# Patient Record
Sex: MALE | Race: WHITE | NOT HISPANIC OR LATINO | URBAN - METROPOLITAN AREA
[De-identification: names, ages, dates, MRNs, and addresses within clinical notes are randomized per-mention and may not be internally consistent; named-entity substitution may affect disease eponyms.]

---
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
## 2021-05-16 VITALS — HEIGHT: 71 IN | WEIGHT: 250 LBS | BODY MASS INDEX: 35 KG/M2 | RESPIRATION RATE: 16 BRPM

## 2021-05-16 VITALS — RESPIRATION RATE: 16 BRPM | HEIGHT: 71 IN | WEIGHT: 250 LBS | BODY MASS INDEX: 35 KG/M2

## 2024-11-07 ENCOUNTER — HOSPITAL ENCOUNTER (EMERGENCY)
Facility: HOSPITAL | Age: 24
Discharge: HOME | End: 2024-11-07
Attending: EMERGENCY MEDICINE
Payer: OTHER MISCELLANEOUS

## 2024-11-07 VITALS
TEMPERATURE: 98.2 F | OXYGEN SATURATION: 99 % | BODY MASS INDEX: 32.47 KG/M2 | HEIGHT: 71 IN | DIASTOLIC BLOOD PRESSURE: 74 MMHG | HEART RATE: 61 BPM | RESPIRATION RATE: 14 BRPM | SYSTOLIC BLOOD PRESSURE: 135 MMHG | WEIGHT: 231.92 LBS

## 2024-11-07 DIAGNOSIS — S61.412A LACERATION OF LEFT HAND WITHOUT FOREIGN BODY, INITIAL ENCOUNTER: Primary | ICD-10-CM

## 2024-11-07 PROCEDURE — 99282 EMERGENCY DEPT VISIT SF MDM: CPT

## 2024-11-07 NOTE — ED TRIAGE NOTES
Pt to ED from work with reports of laceration to top of left hand after heavy object fell on it tonight.      Tetanus shot not UTD.

## 2024-11-07 NOTE — ED PROVIDER NOTES
"Time: 3:44 AM EST  Date of encounter:  11/7/2024  Independent Historian/Clinical History and Information was obtained by:   Patient    History is limited by: N/A    Chief Complaint: Hand laceration      History of Present Illness:  Patient is a 24 y.o. year old male who presents to the emergency department for evaluation of left hand laceration.  Patient states a piece of sharp edged metal came down and cut his left knuckle on his hand.  Patient denies any numbness tingling or weakness.  Patient denies significant pain and rates it just a burning sensation a 1 or a 2 out of 10.  Patient states tetanus shot is up-to-date within 5 years.  Patient is right-handed.      Patient Care Team  Primary Care Provider: Provider, No Known    Past Medical History:     No Known Allergies  History reviewed. No pertinent past medical history.  History reviewed. No pertinent surgical history.  History reviewed. No pertinent family history.    Home Medications:  Prior to Admission medications    Not on File        Social History:          Review of Systems:  Review of Systems   Musculoskeletal:  Negative for joint swelling.   Skin:  Positive for wound (Laceration).   Neurological:  Negative for weakness and numbness.   All other systems reviewed and are negative.       Physical Exam:  /74 (BP Location: Right arm, Patient Position: Sitting)   Pulse 61   Temp 98.2 °F (36.8 °C) (Oral)   Resp 14   Ht 180.3 cm (71\")   Wt 105 kg (231 lb 14.8 oz)   SpO2 99%   BMI 32.35 kg/m²     Physical Exam  Vitals and nursing note reviewed.   Constitutional:       Appearance: Normal appearance.   HENT:      Head: Atraumatic.      Mouth/Throat:      Mouth: Mucous membranes are moist.   Eyes:      Conjunctiva/sclera: Conjunctivae normal.   Cardiovascular:      Pulses: Normal pulses.   Pulmonary:      Effort: Pulmonary effort is normal.   Musculoskeletal:         General: Tenderness (Soft tissue tenderness at laceration site only) and signs of " injury (Laceration left hand) present. No swelling. Normal range of motion.      Cervical back: Normal range of motion.   Skin:     General: Skin is warm.      Capillary Refill: Capillary refill takes less than 2 seconds.      Comments: Patient has a 1.5 centimeter superficial laceration to the left second MCP head dorsal aspect.  No active bleeding   Neurological:      General: No focal deficit present.      Mental Status: He is alert.   Psychiatric:         Mood and Affect: Mood normal.         Behavior: Behavior normal.                  Procedures:  Laceration Repair    Date/Time: 11/7/2024 4:10 AM    Performed by: Pita Hernandez APRN  Authorized by: Lai Love MD    Consent:     Consent obtained:  Verbal    Consent given by:  Patient    Risks, benefits, and alternatives were discussed: yes      Risks discussed:  Infection, pain, poor cosmetic result, poor wound healing, retained foreign body, tendon damage, vascular damage, need for additional repair and nerve damage    Alternatives discussed:  No treatment  Universal protocol:     Procedure explained and questions answered to patient or proxy's satisfaction: yes      Imaging studies available: no      Patient identity confirmed:  Verbally with patient  Anesthesia:     Anesthesia method:  Local infiltration    Local anesthetic:  Lidocaine 1% WITH epi  Laceration details:     Location:  Hand    Hand location:  L hand, dorsum    Length (cm):  1.5    Depth (mm):  1  Pre-procedure details:     Preparation:  Patient was prepped and draped in usual sterile fashion  Exploration:     Hemostasis achieved with:  Direct pressure and epinephrine    Wound exploration: entire depth of wound visualized      Wound extent: no foreign bodies/material noted and no tendon damage noted      Contaminated: no    Treatment:     Area cleansed with:  Povidone-iodine    Amount of cleaning:  Standard    Irrigation solution:  Sterile saline    Irrigation volume:  100    Irrigation  method:  Syringe  Skin repair:     Repair method:  Sutures    Suture size:  4-0    Suture material:  Nylon    Suture technique:  Simple interrupted    Number of sutures:  3  Approximation:     Approximation:  Close  Repair type:     Repair type:  Simple  Post-procedure details:     Dressing:  Non-adherent dressing    Procedure completion:  Tolerated        Medical Decision Making:      Comorbidities that affect care:    None    External Notes reviewed:    Previous Clinic Note: Patient's last visit was to an urgent care back in 2021 for COVID-19      The following orders were placed and all results were independently analyzed by me:  Orders Placed This Encounter   Procedures    Laceration Repair    Wound Dressing       Medications Given in the Emergency Department:  Medications - No data to display     ED Course:         Labs:    Lab Results (last 24 hours)       ** No results found for the last 24 hours. **             Imaging:    No Radiology Exams Resulted Within Past 24 Hours      Differential Diagnosis and Discussion:    Laceration: Laceration was evaluated for arterial injury, ligamentous damage, and other neurovascular injury.        MDM  Number of Diagnoses or Management Options  Laceration of left hand without foreign body, initial encounter  Diagnosis management comments: The patient presented with a laceration in need of repair. See laceration repair note for details. The wound was irrigated with normal saline irrigation. 3 sutures were used to approximate the wound edges. Tetanus was not given.  Patient is up-to-date the patient tolerated the procedure well. Acute bleeding has ceased and the wound was approximated in the emergency department. Patient was counseled to keep the wound clean, dry, and out of the sun. Patient was counseled to change dressings daily. Patient was advised to return to the ED for worsening erythema, pain, swelling, fever, excessive drainage or signs of infection. They were  counseled to follow up for suture removal as described in the discharge instructions. Patient verbalizes understanding and agrees to follow up as instructed.    Risk of Complications, Morbidity, and/or Mortality  Presenting problems: low  Management options: low    Patient Progress  Patient progress: stable           Patient Care Considerations:    I considered x-ray but wound is very superficial patient has full range of motion and no bony tenderness      Consultants/Shared Management Plan:    None    Social Determinants of Health:    Patient is independent, reliable, and has access to care.       Disposition and Care Coordination:    Discharged: The patient is suitable and stable for discharge with no need for consideration of admission.    I have explained the patient´s condition, diagnoses and treatment plan based on the information available to me at this time. I have answered questions and addressed any concerns. The patient has a good  understanding of the patient´s diagnosis, condition, and treatment plan as can be expected at this point. The vital signs have been stable. The patient´s condition is stable and appropriate for discharge from the emergency department.      The patient will pursue further outpatient evaluation with the primary care physician or other designated or consulting physician as outlined in the discharge instructions. They are agreeable to this plan of care and follow-up instructions have been explained in detail. The patient has received these instructions in written format and has expressed an understanding of the discharge instructions. The patient is aware that any significant change in condition or worsening of symptoms should prompt an immediate return to this or the closest emergency department or call to 911.    Final diagnoses:   Laceration of left hand without foreign body, initial encounter        ED Disposition       ED Disposition   Discharge    Condition   Stable     Comment   --               This medical record created using voice recognition software.             Pita Hernandez, APRN  11/07/24 0642

## 2024-11-07 NOTE — Clinical Note
[facility] EMERGENCY ROOM  [address] [place] [address]  Phone: [phone]  Fax: [phone]    Yovanny Caldwell was seen and treated in our emergency department on 11/7/2024.  He may return to work on 11/08/2024.         Thank you for choosing [facility].    Lai Love MD

## 2025-05-27 ENCOUNTER — HOSPITAL ENCOUNTER (EMERGENCY)
Facility: HOSPITAL | Age: 25
Discharge: HOME | End: 2025-05-27
Attending: EMERGENCY MEDICINE | Admitting: EMERGENCY MEDICINE
Payer: COMMERCIAL

## 2025-05-27 VITALS
RESPIRATION RATE: 16 BRPM | WEIGHT: 210 LBS | BODY MASS INDEX: 29.4 KG/M2 | HEIGHT: 71 IN | HEART RATE: 58 BPM | DIASTOLIC BLOOD PRESSURE: 64 MMHG | SYSTOLIC BLOOD PRESSURE: 105 MMHG | TEMPERATURE: 98.6 F | OXYGEN SATURATION: 99 %

## 2025-05-27 DIAGNOSIS — R04.0 EPISTAXIS: Primary | ICD-10-CM

## 2025-05-27 LAB
ALBUMIN SERPL-MCNC: 4.3 G/DL (ref 3.5–5.2)
ALBUMIN/GLOB SERPL: 1.9 G/DL
ALP SERPL-CCNC: 68 U/L (ref 39–117)
ALT SERPL W P-5'-P-CCNC: 47 U/L (ref 1–41)
ANION GAP SERPL CALCULATED.3IONS-SCNC: 11.8 MMOL/L (ref 5–15)
AST SERPL-CCNC: 26 U/L (ref 1–40)
BASOPHILS # BLD AUTO: 0.06 10*3/MM3 (ref 0–0.2)
BASOPHILS NFR BLD AUTO: 0.8 % (ref 0–1.5)
BILIRUB SERPL-MCNC: 0.3 MG/DL (ref 0–1.2)
BUN SERPL-MCNC: 23 MG/DL (ref 6–20)
BUN/CREAT SERPL: 19.3 (ref 7–25)
CALCIUM SPEC-SCNC: 9.2 MG/DL (ref 8.6–10.5)
CHLORIDE SERPL-SCNC: 105 MMOL/L (ref 98–107)
CO2 SERPL-SCNC: 24.2 MMOL/L (ref 22–29)
CREAT SERPL-MCNC: 1.19 MG/DL (ref 0.76–1.27)
DEPRECATED RDW RBC AUTO: 38.5 FL (ref 37–54)
EGFRCR SERPLBLD CKD-EPI 2021: 87.5 ML/MIN/1.73
EOSINOPHIL # BLD AUTO: 0.12 10*3/MM3 (ref 0–0.4)
EOSINOPHIL NFR BLD AUTO: 1.7 % (ref 0.3–6.2)
ERYTHROCYTE [DISTWIDTH] IN BLOOD BY AUTOMATED COUNT: 11.9 % (ref 12.3–15.4)
GLOBULIN UR ELPH-MCNC: 2.3 GM/DL
GLUCOSE SERPL-MCNC: 96 MG/DL (ref 65–99)
HCT VFR BLD AUTO: 41.2 % (ref 37.5–51)
HGB BLD-MCNC: 14.4 G/DL (ref 13–17.7)
HOLD SPECIMEN: NORMAL
HOLD SPECIMEN: NORMAL
IMM GRANULOCYTES # BLD AUTO: 0.02 10*3/MM3 (ref 0–0.05)
IMM GRANULOCYTES NFR BLD AUTO: 0.3 % (ref 0–0.5)
LYMPHOCYTES # BLD AUTO: 1.44 10*3/MM3 (ref 0.7–3.1)
LYMPHOCYTES NFR BLD AUTO: 20.3 % (ref 19.6–45.3)
MCH RBC QN AUTO: 30.8 PG (ref 26.6–33)
MCHC RBC AUTO-ENTMCNC: 35 G/DL (ref 31.5–35.7)
MCV RBC AUTO: 88.2 FL (ref 79–97)
MONOCYTES # BLD AUTO: 0.43 10*3/MM3 (ref 0.1–0.9)
MONOCYTES NFR BLD AUTO: 6.1 % (ref 5–12)
NEUTROPHILS NFR BLD AUTO: 5.03 10*3/MM3 (ref 1.7–7)
NEUTROPHILS NFR BLD AUTO: 70.8 % (ref 42.7–76)
NRBC BLD AUTO-RTO: 0 /100 WBC (ref 0–0.2)
PLATELET # BLD AUTO: 227 10*3/MM3 (ref 140–450)
PMV BLD AUTO: 11.5 FL (ref 6–12)
POTASSIUM SERPL-SCNC: 3.7 MMOL/L (ref 3.5–5.2)
PROT SERPL-MCNC: 6.6 G/DL (ref 6–8.5)
RBC # BLD AUTO: 4.67 10*6/MM3 (ref 4.14–5.8)
SODIUM SERPL-SCNC: 141 MMOL/L (ref 136–145)
WBC NRBC COR # BLD AUTO: 7.1 10*3/MM3 (ref 3.4–10.8)
WHOLE BLOOD HOLD COAG: NORMAL
WHOLE BLOOD HOLD SPECIMEN: NORMAL

## 2025-05-27 PROCEDURE — 80053 COMPREHEN METABOLIC PANEL: CPT | Performed by: EMERGENCY MEDICINE

## 2025-05-27 PROCEDURE — 36415 COLL VENOUS BLD VENIPUNCTURE: CPT

## 2025-05-27 PROCEDURE — 85025 COMPLETE CBC W/AUTO DIFF WBC: CPT | Performed by: EMERGENCY MEDICINE

## 2025-05-27 PROCEDURE — 99283 EMERGENCY DEPT VISIT LOW MDM: CPT

## 2025-05-27 RX ORDER — GOLDENSEAL 350 MG
250 CAPSULE ORAL ONCE
COMMUNITY

## 2025-05-27 RX ADMIN — PHENYLEPHRINE HYDROCHLORIDE 2 SPRAY: 0.5 SPRAY NASAL at 08:04

## 2025-05-27 NOTE — DISCHARGE INSTRUCTIONS
Leave the nasal packing until removed by medical provider.  This will likely need to be in for roughly 10 days.  Take the antibiotics as prescribed.  Avoid strenuous activities until packing has been removed.  I placed a referral for ENT follow-up.  You should receive a phone call later this week with a follow-up appointment.  Return to the ER for any new symptoms or any other issues that may arise.

## 2025-05-27 NOTE — ED PROVIDER NOTES
Time: 9:19 AM EDT  Date of encounter:  5/27/2025  Independent Historian/Clinical History and Information was obtained by:   Patient  Chief Complaint: Recurrent nosebleed    History is limited by: N/A    History of Present Illness:  Patient is a 24 y.o. year old male who presents to the emergency department for evaluation of recurrent nosebleed.  Patient states that on Saturday he was playing volleyball.  Patient is that he was accidentally struck across the nose.  Patient reports at the time he had bleeding both sides of his nose was able to get this stopped.  It bled again on Sunday morning but stopped.  Patient reports that this morning and began bleeding again while at work.  Patient is that bled for about an hour.  He went to the nurses station at work.  He began to feel lightheaded so the nurse sent him to the ER for evaluation and treatment.    Patient Care Team  Primary Care Provider: ProviderVerenice Known    Past Medical History:     No Known Allergies  History reviewed. No pertinent past medical history.  History reviewed. No pertinent surgical history.  History reviewed. No pertinent family history.    Home Medications:  Prior to Admission medications    Medication Sig Start Date End Date Taking? Authorizing Provider   Apple Cider Vinegar 250 MG chewable tablet Chew 250 mg 1 time.    Mariela Espinoza MD   [id number] PO Take 5,000 mg by mouth 1 time.    Mariela Espinoza MD   Multiple Vitamins-Minerals (MENS MULTI HEALTH FORMULA PO) Take  by mouth.    Mariela Espinoza MD        Social History:   Social History     Tobacco Use    Smoking status: Never   Vaping Use    Vaping status: Never Used   Substance Use Topics    Alcohol use: Never    Drug use: Never         Review of Systems:  Review of Systems   Constitutional:  Negative for chills and fever.   HENT:  Positive for nosebleeds. Negative for congestion, ear pain and sore throat.    Eyes:  Negative for pain.   Respiratory:  Negative for  "cough, chest tightness and shortness of breath.    Cardiovascular:  Negative for chest pain.   Gastrointestinal:  Negative for abdominal pain, diarrhea, nausea and vomiting.   Genitourinary:  Negative for flank pain and hematuria.   Musculoskeletal:  Negative for joint swelling.   Skin:  Negative for pallor.   Neurological:  Positive for light-headedness. Negative for seizures and headaches.   All other systems reviewed and are negative.       Physical Exam:  /70 (BP Location: Left arm, Patient Position: Lying)   Pulse 70   Temp 98.6 °F (37 °C) (Oral)   Resp 20   Ht 180.3 cm (71\")   Wt 95.3 kg (210 lb)   SpO2 98%   BMI 29.29 kg/m²     Physical Exam    Vital signs were reviewed under triage note.  General appearance - Patient appears well-developed and well-nourished.  Patient is in no acute distress.  Head - Normocephalic, atraumatic.  Pupils - Equal, round, reactive to light.  Extraocular muscles are intact.  Conjunctiva is clear.  Nasal - Normal inspection.  There is stigmata of recent bleeding on the right nasal septum.  There is no active bleeding at this time.  There is no septal hematoma noted.    Tympanic membranes - Gray, intact without erythema or retractions.  Oral mucosa - Pink and moist without lesions or erythema.  Uvula is midline.  Chest wall - Atraumatic.  Chest wall is nontender.  There are no vesicular rashes noted.  Neck - Supple.  Trachea was midline.  There is no palpable lymphadenopathy or thyromegaly.  There are no meningeal signs  Lungs - Clear to auscultation and percussion bilaterally.  Heart - Regular rate and rhythm without any murmurs, clicks, or gallops.  Abdomen - Soft.  Bowel sounds are present.  There is no palpable tenderness.  There is no rebound, guarding, or rigidity.  There are no palpable masses.  There are no pulsatile masses.  Back - Spine is straight and midline.  There is no CVA tenderness.  Extremities - Intact x4 with full range of motion.  There is no " palpable edema.  Pulses are intact x4 and equal.  Neurologic - Patient is awake, alert, and oriented x3.  Cranial nerves II through XII are grossly intact.  Motor and sensory functions grossly intact.  Cerebellar function was normal.  Integument - There are no rashes.  There are no petechia or purpura lesions noted.  There are no vesicular lesions noted.             Procedures:  Procedures  Nasal packing - Verbal consent was obtained from the patient.  The right nare was prepped with 4 sprays of Rosamaria-Synephrine nasal spray.  The nasal cavity was evacuated of any clots.  I attempted to place a 10 cm pack however the patient would not sit still and tolerate insertion.  Thus, I had no other option then to place a 5 cm M[place]ell nasal pack in the nose on the right side.  Once it was fully inserted it was expanded with sterile saline.  Patient was monitored for 30 minutes with no signs of any active bleeding.    Medical Decision Making:      Comorbidities that affect care:    None    External Notes reviewed:    Previous Clinic Note: Office visit with HAYLIE Snell on 8/28/2021 was reviewed by me.      The following orders were placed and all results were independently analyzed by me:  Orders Placed This Encounter   Procedures    Comprehensive Metabolic Panel    [facility] Draw    CBC Auto Differential    Ambulatory Referral to ENT (Otolaryngology)    CBC & Differential    Green Top (Gel)    Lavender Top    Gold Top - SST    Light Blue Top       Medications Given in the Emergency Department:  Medications   phenylephrine (ROSAMARIA-SYNEPHRINE) 0.5 % nasal spray 2 spray (2 sprays Nasal Given 5/27/25 0804)        ED Course:     The patient was seen and evaluated in the ED by me.  The above history and physical examination was performed as documented.  Diagnostic data was obtained.  Results reviewed.  Findings were discussed with the patient.  Nasal packing was placed on the right side.  Patient is tolerated well.  Patient was  monitored for period time after packing was placed.  There is no evidence of active bleeding.  Patient for discharge home.  Patient be referred for outpatient ENT follow-up and treatment.    Labs:    Lab Results (last 24 hours)       Procedure Component Value Units Date/Time    CBC & Differential [[id number]]  (Abnormal) Collected: 05/27/25 0638    Specimen: Blood Updated: 05/27/25 0651    Narrative:      The following orders were created for panel order CBC & Differential.  Procedure                               Abnormality         Status                     ---------                               -----------         ------                     CBC Auto Differential[[id number]]        Abnormal            Final result                 Please view results for these tests on the individual orders.    Comprehensive Metabolic Panel [[id number]]  (Abnormal) Collected: 05/27/25 0638    Specimen: Blood Updated: 05/27/25 0705     Glucose 96 mg/dL      BUN 23 mg/dL      Creatinine 1.19 mg/dL      Sodium 141 mmol/L      Potassium 3.7 mmol/L      Chloride 105 mmol/L      CO2 24.2 mmol/L      Calcium 9.2 mg/dL      Total Protein 6.6 g/dL      Albumin 4.3 g/dL      ALT (SGPT) 47 U/L      AST (SGOT) 26 U/L      Alkaline Phosphatase 68 U/L      Total Bilirubin 0.3 mg/dL      Globulin 2.3 gm/dL      A/G Ratio 1.9 g/dL      BUN/Creatinine Ratio 19.3     Anion Gap 11.8 mmol/L      eGFR 87.5 mL/min/1.73     Narrative:      GFR Categories in Chronic Kidney Disease (CKD)              GFR Category          GFR (mL/min/1.73)    Interpretation  G1                    90 or greater        Normal or high (1)  G2                    60-89                Mild decrease (1)  G3a                   45-59                Mild to moderate decrease  G3b                   30-44                Moderate to severe decrease  G4                    15-29                Severe decrease  G5                    14 or less           Kidney failure    (1)In the absence  of evidence of kidney disease, neither GFR category G1 or G2 fulfill the criteria for CKD.    eGFR calculation 2021 CKD-EPI creatinine equation, which does not include race as a factor    CBC Auto Differential [[id number]]  (Abnormal) Collected: 05/27/25 0638    Specimen: Blood Updated: 05/27/25 0651     WBC 7.10 10*3/mm3      RBC 4.67 10*6/mm3      Hemoglobin 14.4 g/dL      Hematocrit 41.2 %      MCV 88.2 fL      MCH 30.8 pg      MCHC 35.0 g/dL      RDW 11.9 %      RDW-SD 38.5 fl      MPV 11.5 fL      Platelets 227 10*3/mm3      Neutrophil % 70.8 %      Lymphocyte % 20.3 %      Monocyte % 6.1 %      Eosinophil % 1.7 %      Basophil % 0.8 %      Immature Grans % 0.3 %      Neutrophils, Absolute 5.03 10*3/mm3      Lymphocytes, Absolute 1.44 10*3/mm3      Monocytes, Absolute 0.43 10*3/mm3      Eosinophils, Absolute 0.12 10*3/mm3      Basophils, Absolute 0.06 10*3/mm3      Immature Grans, Absolute 0.02 10*3/mm3      nRBC 0.0 /100 WBC              Imaging:    No Radiology Exams Resulted Within Past 24 Hours      Differential Diagnosis and Discussion:    Epistaxis: Differential diagnosis includes but is not limited to trauma, environmental exposure, coagulopathy, allergic, infectious, hypertension, foreign bodies, hormonal, and tumors.    Labs were collected in the emergency department and all labs were reviewed and interpreted by me.    MDM     Amount and/or Complexity of Data Reviewed  Clinical lab tests: reviewed             Patient Care Considerations:    CT scan of facial bones was considered.  There was no obvious deformity and this would not change the ED treatment.      Consultants/Shared Management Plan:    None    Social Determinants of Health:    Patient is independent, reliable, and has access to care.       Disposition and Care Coordination:    Discharged: The patient is suitable and stable for discharge with no need for consideration of admission.    I have explained the patient´s condition, diagnoses and  treatment plan based on the information available to me at this time. I have answered questions and addressed any concerns. The patient has a good  understanding of the patient´s diagnosis, condition, and treatment plan as can be expected at this point. The vital signs have been stable. The patient´s condition is stable and appropriate for discharge from the emergency department.      The patient will pursue further outpatient evaluation with the primary care physician or other designated or consulting physician as outlined in the discharge instructions. They are agreeable to this plan of care and follow-up instructions have been explained in detail. The patient has received these instructions in written format and has expressed an understanding of the discharge instructions. The patient is aware that any significant change in condition or worsening of symptoms should prompt an immediate return to this or the closest emergency department or call to 1.  I have explained discharge medications and the need for follow up with the patient/caretakers. This was also printed in the discharge instructions. Patient was discharged with the following medications and follow up:      Medication List        New Prescriptions      amoxicillin-clavulanate 875-125 MG per tablet  Commonly known as: AUGMENTIN  Take 1 tablet by mouth Every 12 (Twelve) Hours.               Where to Get Your Medications        These medications were sent to [id number] - [place], [place] - [address] [place] [address] AT [facility] [phone] [facility] [phone] [address] [address]      Phone: [phone]   amoxicillin-clavulanate 875-125 MG per tablet      [facility]  [address] [address] [phone]          Final diagnoses:   Epistaxis        ED Disposition       ED Disposition   Discharge    Condition   Stable     Comment   --               This medical record created using voice recognition software.             Oscar Randle, DO  05/28/25 1506       Oscar Randle, DO  05/28/25 1507

## 2025-05-27 NOTE — Clinical Note
[facility] EMERGENCY ROOM  [address] [place] [address]  Phone: [phone]  Fax: [phone]    Yovanny Caldwell was seen and treated in our emergency department on 5/27/2025.  He may return to work on 05/30/2025.         Thank you for choosing [facility].    Raghav Pavon RN
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
## 2025-05-28 ENCOUNTER — HOSPITAL ENCOUNTER (EMERGENCY)
Facility: HOSPITAL | Age: 25
Discharge: HOME | End: 2025-05-28
Attending: EMERGENCY MEDICINE | Admitting: EMERGENCY MEDICINE
Payer: COMMERCIAL

## 2025-05-28 VITALS
TEMPERATURE: 98 F | HEART RATE: 85 BPM | OXYGEN SATURATION: 99 % | HEIGHT: 71 IN | DIASTOLIC BLOOD PRESSURE: 80 MMHG | BODY MASS INDEX: 29.63 KG/M2 | SYSTOLIC BLOOD PRESSURE: 112 MMHG | WEIGHT: 211.64 LBS | RESPIRATION RATE: 16 BRPM

## 2025-05-28 DIAGNOSIS — R04.0 EPISTAXIS: Primary | ICD-10-CM

## 2025-05-28 LAB
BASOPHILS # BLD AUTO: 0.06 10*3/MM3 (ref 0–0.2)
BASOPHILS NFR BLD AUTO: 0.9 % (ref 0–1.5)
DEPRECATED RDW RBC AUTO: 39.1 FL (ref 37–54)
EOSINOPHIL # BLD AUTO: 0.05 10*3/MM3 (ref 0–0.4)
EOSINOPHIL NFR BLD AUTO: 0.7 % (ref 0.3–6.2)
ERYTHROCYTE [DISTWIDTH] IN BLOOD BY AUTOMATED COUNT: 12.2 % (ref 12.3–15.4)
HCT VFR BLD AUTO: 41 % (ref 37.5–51)
HGB BLD-MCNC: 14.2 G/DL (ref 13–17.7)
IMM GRANULOCYTES # BLD AUTO: 0.01 10*3/MM3 (ref 0–0.05)
IMM GRANULOCYTES NFR BLD AUTO: 0.1 % (ref 0–0.5)
LYMPHOCYTES # BLD AUTO: 1.03 10*3/MM3 (ref 0.7–3.1)
LYMPHOCYTES NFR BLD AUTO: 15.2 % (ref 19.6–45.3)
MCH RBC QN AUTO: 31 PG (ref 26.6–33)
MCHC RBC AUTO-ENTMCNC: 34.6 G/DL (ref 31.5–35.7)
MCV RBC AUTO: 89.5 FL (ref 79–97)
MONOCYTES # BLD AUTO: 0.29 10*3/MM3 (ref 0.1–0.9)
MONOCYTES NFR BLD AUTO: 4.3 % (ref 5–12)
NEUTROPHILS NFR BLD AUTO: 5.35 10*3/MM3 (ref 1.7–7)
NEUTROPHILS NFR BLD AUTO: 78.8 % (ref 42.7–76)
NRBC BLD AUTO-RTO: 0 /100 WBC (ref 0–0.2)
PLATELET # BLD AUTO: 224 10*3/MM3 (ref 140–450)
PMV BLD AUTO: 11.3 FL (ref 6–12)
RBC # BLD AUTO: 4.58 10*6/MM3 (ref 4.14–5.8)
WBC NRBC COR # BLD AUTO: 6.79 10*3/MM3 (ref 3.4–10.8)

## 2025-05-28 PROCEDURE — C9046 COCAINE HCL NASAL SOLUTION: HCPCS | Performed by: EMERGENCY MEDICINE

## 2025-05-28 PROCEDURE — 99283 EMERGENCY DEPT VISIT LOW MDM: CPT

## 2025-05-28 PROCEDURE — 36415 COLL VENOUS BLD VENIPUNCTURE: CPT

## 2025-05-28 PROCEDURE — 85025 COMPLETE CBC W/AUTO DIFF WBC: CPT | Performed by: NURSE PRACTITIONER

## 2025-05-28 PROCEDURE — 25010000002 COCAINE HCL 40 MG/ML SOLUTION: Performed by: EMERGENCY MEDICINE

## 2025-05-28 RX ORDER — COCAINE HYDROCHLORIDE 40 MG/ML
4 SOLUTION NASAL ONCE
Refills: 0 | Status: COMPLETED | OUTPATIENT
Start: 2025-05-28 | End: 2025-05-28

## 2025-05-28 RX ORDER — TRANEXAMIC ACID 100 MG/ML
500 INJECTION, SOLUTION INTRAVENOUS ONCE
Status: DISCONTINUED | OUTPATIENT
Start: 2025-05-28 | End: 2025-05-28 | Stop reason: HOSPADM

## 2025-05-28 RX ADMIN — PHENYLEPHRINE HYDROCHLORIDE 2 SPRAY: 0.5 SPRAY NASAL at 16:38

## 2025-05-28 RX ADMIN — COCAINE HYDROCHLORIDE 160 MG: 40 SOLUTION NASAL at 18:33

## 2025-05-28 NOTE — Clinical Note
[facility] EMERGENCY ROOM  [address] [place] [address]  Phone: [phone]  Fax: [phone]    Yovanny Caldwell was seen and treated in our emergency department on 5/28/2025.  He may return to work on 06/02/2025.         Thank you for choosing [facility].    Edmundo Richter, FRANCISCA

## 2025-05-28 NOTE — DISCHARGE INSTRUCTIONS
Thank you for allowing us to provide care for you today.  Your workup today revealed evidence of epistaxis with packing performed at bedside . Please follow up with Dr. Kirby's clinic for follow up to remove your rhino rocket. Please limit caffeine and stimulant use until follow up in clinic. Avoid contact sports. Do not tamper with the rocket. Return if the rocket falls out or your nose begins to bleed again. Thank you

## 2025-05-28 NOTE — ED PROVIDER NOTES
"SHARED VISIT ATTESTATION:    This visit was performed by myself and an APC.  I personally approved the management plan/medical decision making and take responsibility for the patient management.      SHARED VISIT NOTE:    Patient is 24 y.o. year old male that presents to the ED for evaluation of recurrent nosebleed.     Physical Exam    ED Course:    /80   Pulse 85   Temp 98 °F (36.7 °C)   Resp 16   Ht 180.3 cm (71\")   Wt 96 kg (211 lb 10.3 oz)   SpO2 99%   BMI 29.52 kg/m²       The following orders were placed and all results were independently analyzed by me:  Orders Placed This Encounter   Procedures    Epistaxis Management    Epistaxis Management    CBC Auto Differential    Ambulatory Referral to ENT (Otolaryngology)    CBC & Differential       Medications Given in the Emergency Department:  Medications   Cocaine HCl 40 MG/ML nasal solution 160 mg (160 mg Nasal Given 5/28/25 1833)        ED Course:    ED Course as of 05/29/25 1735   Wed May 28, 2025   1638 Epifanio-Synephrine, 2 sprays to each nostril administered [TB]   1730 Nose clamp applied after the Epifanio-Synephrine earlier, and reevaluation shows that bleeding has eased some. [TB]   1813 Patient has began bleeding heavily again from bilateral nares, but I believe is originating from the posterior right nare.  He is also beginning to vomit blood that he has likely swallowed from his nosebleed.  Corina LI spoke with Surya Jang, and we will be transferring patient over to main ED for monitoring. [TB]   1820 Report given to Edmundo in main ED [TB]      ED Course User Index  [TB] Chasidy Oden APRN       Labs:    Lab Results (last 24 hours)       Procedure Component Value Units Date/Time    Hemoglobin & Hematocrit, Blood [[id number]]  (Normal) Collected: 05/29/25 0741    Specimen: Blood from Arm, Right Updated: 05/29/25 0759     Hemoglobin 13.7 g/dL      Hematocrit 40.3 %              Imaging:    CT Sinus With Contrast  Result Date: 5/29/2025  CT SINUS W " CONTRAST Date of Exam: 5/29/2025 8:07 AM EDT Indication: eval for JNA, recurrent epistaxis. Comparison: None available. Technique: Axial CT images were obtained from the inferior aspect of the mandible through the frontal sinuses after the uneventful intravenous administration of iodinated contrast.  Reconstructed coronal and sagittal images were also obtained. Automated exposure control and iterative construction methods were used. Findings: There is no evidence of juvenile nasal angiofibroma. The sphenopalatine foramina appear normal bilaterally. There are air-fluid levels noted in the bilateral maxillary sinuses which demonstrate increased density. A small air-fluid level is noted in the right sphenoid sinus. There is opacification of the right ostiomeatal complex. There is opacification of several inferior right ethmoid sinuses. There is some opacity within the right nasal cavity. A cannula has been placed in the right nasal cavity. There are pooling secretions noted in the nasopharynx. There is a nondisplaced fracture of the left nasal bone. No other fractures identified. The visualized brain appears normal.     Impression: Tiny nondisplaced fracture of the left nasal bone. No evidence of nasal angiofibroma. Opacity in multiple sinuses and the right nasal cavity/nasopharynx suspected to represent hemorrhage. Electronically Signed: Frederick Blanco MD  5/29/2025 8:47 AM EDT  Workstation ID: [id number]      MDM:    Epistaxis Management    Date/Time: 5/29/2025 5:35 PM    Performed by: Lai Love MD  Authorized by: Lai Love MD    Consent:     Consent obtained:  Verbal    Consent given by:  Patient    Risks discussed:  Bleeding, pain, nasal injury and infection    Alternatives discussed:  No treatment and delayed treatment  Universal protocol:     Patient identity confirmed:  Verbally with patient  Anesthesia:     Anesthesia method:  Topical application    Topical anesthetic:  Cocaine  Procedure details:      Treatment site:  R posterior    Treatment method:  Nasal balloon    Treatment complexity:  Limited    Treatment episode: initial    Post-procedure details:     Assessment:  Bleeding stopped    Procedure completion:  Tolerated well, no immediate complications                           Lai Love MD  17:35 EDT  05/29/25         Lai Love MD  05/29/25 2639

## 2025-05-28 NOTE — ED PROVIDER NOTES
Time: 2:37 PM EDT  Date of encounter:  5/28/2025  Independent Historian/Clinical History and Information was obtained by:   Patient    History is limited by: N/A    Chief Complaint: Nosebleed, right nare      History of Present Illness:  Patient is a 24 y.o. year old male who presents to the emergency department for evaluation of nosebleed.  Patient was seen in the ED yesterday for the same thing and has a nasal tampon to the right nare.  Patient states he was just walking around his home today and it started bleeding again.  It is a posterior bleed.      Patient Care Team  Primary Care Provider: Provider, Verenice Known    Past Medical History:     No Known Allergies  History reviewed. No pertinent past medical history.  History reviewed. No pertinent surgical history.  History reviewed. No pertinent family history.    Home Medications:  Prior to Admission medications    Medication Sig Start Date End Date Taking? Authorizing Provider   amoxicillin-clavulanate (AUGMENTIN) 875-125 MG per tablet Take 1 tablet by mouth Every 12 (Twelve) Hours. 5/27/25   Oscar Randle DO   Apple Cider Vinegar 250 MG chewable tablet Chew 250 mg 1 time.    Mariela Espinoza MD   [id number] PO Take 5,000 mg by mouth 1 time.    Mariela Espinoza MD   Multiple Vitamins-Minerals (MENS MULTI HEALTH FORMULA PO) Take  by mouth.    Mariela Espinoza MD        Social History:   Social History     Tobacco Use    Smoking status: Never   Vaping Use    Vaping status: Never Used   Substance Use Topics    Alcohol use: Never    Drug use: Never         Review of Systems:  Review of Systems   Constitutional:  Negative for chills and fever.   HENT:  Positive for nosebleeds (Left nare leaking blood.  Right nare has nasal tampon). Negative for congestion, rhinorrhea and sore throat.    Eyes:  Negative for pain and visual disturbance.   Respiratory:  Negative for apnea, cough, chest tightness and shortness of breath.    Cardiovascular:  Negative for  "chest pain and palpitations.   Gastrointestinal:  Negative for abdominal pain, diarrhea, nausea and vomiting.   Genitourinary:  Negative for difficulty urinating and dysuria.   Musculoskeletal:  Negative for joint swelling and myalgias.   Skin:  Negative for color change.   Neurological:  Negative for seizures and headaches.   Psychiatric/Behavioral: Negative.     All other systems reviewed and are negative.       Physical Exam:  /80   Pulse 85   Temp 98 °F (36.7 °C)   Resp 16   Ht 180.3 cm (71\")   Wt 96 kg (211 lb 10.3 oz)   SpO2 99%   BMI 29.52 kg/m²     Physical Exam  Vitals and nursing note reviewed.   Constitutional:       General: He is not in acute distress.     Appearance: Normal appearance. He is not toxic-appearing.   HENT:      Head: Normocephalic and atraumatic.      Jaw: There is normal jaw occlusion.      Nose:      Comments: With heavy bleeding to the upper rear pharynx where it is coming from the right nare posteriorly.  Eyes:      General: Lids are normal.      Extraocular Movements: Extraocular movements intact.      Conjunctiva/sclera: Conjunctivae normal.      Pupils: Pupils are equal, round, and reactive to light.   Cardiovascular:      Rate and Rhythm: Normal rate and regular rhythm.      Pulses: Normal pulses.      Heart sounds: Normal heart sounds.   Pulmonary:      Effort: Pulmonary effort is normal. No respiratory distress.      Breath sounds: Normal breath sounds. No wheezing or rhonchi.   Abdominal:      General: Abdomen is flat.      Palpations: Abdomen is soft.      Tenderness: There is no abdominal tenderness. There is no guarding or rebound.   Musculoskeletal:         General: Normal range of motion.      Cervical back: Normal range of motion and neck supple.      Right lower leg: No edema.      Left lower leg: No edema.   Skin:     General: Skin is warm and dry.   Neurological:      Mental Status: He is alert and oriented to person, place, and time. Mental status is at " baseline.   Psychiatric:         Mood and Affect: Mood normal.                    Medical Decision Making:      Comorbidities that affect care:    None    External Notes reviewed:    Previous ED Note: ED note from 5/27/2025 for same complaint      The following orders were placed and all results were independently analyzed by me:  Orders Placed This Encounter   Procedures    Epistaxis Management    CBC Auto Differential    Ambulatory Referral to ENT (Otolaryngology)    CBC & Differential       Medications Given in the Emergency Department:  Medications   Cocaine HCl 40 MG/ML nasal solution 160 mg (160 mg Nasal Given 5/28/25 1833)        ED Course:    ED Course as of 05/29/25 0219   Wed May 28, 2025   1638 Epifanio-Synephrine, 2 sprays to each nostril administered [TB]   1730 Nose clamp applied after the Epifanio-Synephrine earlier, and reevaluation shows that bleeding has eased some. [TB]   1813 Patient has began bleeding heavily again from bilateral nares, but I believe is originating from the posterior right nare.  He is also beginning to vomit blood that he has likely swallowed from his nosebleed.  Corina LI spoke with Surya Jang, and we will be transferring patient over to main ED for monitoring. [TB]   1820 Report given to Edmundo in main ED [TB]      ED Course User Index  [TB] Chasidy Oden, HAYLIE       Labs:    Lab Results (last 24 hours)       Procedure Component Value Units Date/Time    CBC & Differential [[id number]]  (Abnormal) Collected: 05/28/25 1727    Specimen: Blood Updated: 05/28/25 1732    Narrative:      The following orders were created for panel order CBC & Differential.  Procedure                               Abnormality         Status                     ---------                               -----------         ------                     CBC Auto Differential[[id number]]        Abnormal            Final result                 Please view results for these tests on the individual orders.    CBC Auto  Differential [[id number]]  (Abnormal) Collected: 05/28/25 1727    Specimen: Blood Updated: 05/28/25 1732     WBC 6.79 10*3/mm3      RBC 4.58 10*6/mm3      Hemoglobin 14.2 g/dL      Hematocrit 41.0 %      MCV 89.5 fL      MCH 31.0 pg      MCHC 34.6 g/dL      RDW 12.2 %      RDW-SD 39.1 fl      MPV 11.3 fL      Platelets 224 10*3/mm3      Neutrophil % 78.8 %      Lymphocyte % 15.2 %      Monocyte % 4.3 %      Eosinophil % 0.7 %      Basophil % 0.9 %      Immature Grans % 0.1 %      Neutrophils, Absolute 5.35 10*3/mm3      Lymphocytes, Absolute 1.03 10*3/mm3      Monocytes, Absolute 0.29 10*3/mm3      Eosinophils, Absolute 0.05 10*3/mm3      Basophils, Absolute 0.06 10*3/mm3      Immature Grans, Absolute 0.01 10*3/mm3      nRBC 0.0 /100 WBC              Imaging:    No Radiology Exams Resulted Within Past 24 Hours      Differential Diagnosis and Discussion:    Epistaxis: Differential diagnosis includes but is not limited to trauma, environmental exposure, coagulopathy, allergic, infectious, hypertension, foreign bodies, hormonal, and tumors.    PROCEDURES:    Labs were collected in the emergency department and all labs were reviewed and interpreted by me.    No orders to display       Epistaxis Management    Date/Time: 5/29/2025 2:12 AM    Performed by: Edmundo Richter PA-C  Authorized by: Lai Love MD    Consent:     Consent obtained:  Verbal    Consent given by:  Patient    Risks, benefits, and alternatives were discussed: yes      Risks discussed:  Bleeding and infection    Alternatives discussed:  Delayed treatment, no treatment and alternative treatment  Universal protocol:     Patient identity confirmed:  Verbally with patient  Anesthesia:     Anesthesia method:  Topical application    Topical anesthetic:  Cocaine  Procedure details:     Treatment site:  R anterior    Repair method: Rhino Rocket.    Treatment complexity:  Limited    Treatment episode: recurring    Post-procedure details:     Assessment:   Bleeding stopped    Procedure completion:  Tolerated well, no immediate complications      MDM     Amount and/or Complexity of Data Reviewed  Clinical lab tests: reviewed    Summary, patient is a 24-year-old male presenting today secondary to acute epistaxis x 1 day.    Patient's vital signs remained stable throughout the entirety of the ED course.  Patient's H&H remained stable throughout the entirety of the ED course.  Patient's initial epistaxes treatment including phenylephrine spray, nasal compression, and anterior packing failed with patient's right nares epistaxis continuing.  Based on these findings, subsequent cocaine nasal solution was given for vasoconstriction.  This also failed to resolve epistaxis management alongside compression.  Due to failed initial therapies, a subsequent right naris Rhino Rocket was applied to the right nares and inflated.  Tolerated the procedure appropriately.  Patient's epistaxis subsequently stopped.  Patient had numerous repeat bedside examinations completed by myself which revealed no evidence of new onset epistaxis or oropharyngeal bleeding present.  I discussed outpatient Rhino Rocket management with the patient.  I discussed outpatient follow-up with ear nose throat in the next 48 to 72 hours related to this ED event.  I discussed return precautions.  Patient voiced understanding agreement at this time                  Patient Care Considerations:    SEPSIS was considered but is NOT present in the emergency department as SIRS criteria is not present. ANTIBIOTICS: I considered prescribing antibiotics as an outpatient however no bacterial focus of infection was found.      Consultants/Shared Management Plan:    SHARED VISIT: I have discussed the case with my supervising physician, Dr. Love who states Crees with treatment plan. The substantive portion of the medical decision was made by the attesting physician who made or approve the management plan and will take  responsibility for the patient.  Clinical findings were discussed and ultimate disposition was made in consult with supervising physician.    Social Determinants of Health:    Patient is independent, reliable, and has access to care.       Disposition and Care Coordination:    Discharged: I considered escalation of care by admitting this patient to the hospital, however patient is not meet sepsis or SIRS criteria    I have explained the patient´s condition, diagnoses and treatment plan based on the information available to me at this time. I have answered questions and addressed any concerns. The patient has a good  understanding of the patient´s diagnosis, condition, and treatment plan as can be expected at this point. The vital signs have been stable. The patient´s condition is stable and appropriate for discharge from the emergency department.      The patient will pursue further outpatient evaluation with the primary care physician or other designated or consulting physician as outlined in the discharge instructions. They are agreeable to this plan of care and follow-up instructions have been explained in detail. The patient has received these instructions in written format and has expressed an understanding of the discharge instructions. The patient is aware that any significant change in condition or worsening of symptoms should prompt an immediate return to this or the closest emergency department or call to 1.  I have explained discharge medications and the need for follow up with the patient/caretakers. This was also printed in the discharge instructions. Patient was discharged with the following medications and follow up:      Medication List      No changes were made to your prescriptions during this visit.      Marco Kirby MD  [address] [place] [id number]  [phone]    Schedule an appointment as soon as possible for a visit         Final diagnoses:   Epistaxis        ED  Disposition       ED Disposition   Discharge    Condition   Stable    Comment   --               This medical record created using voice recognition software.             Edmundo Richter PA-C  05/29/25 7320

## 2025-05-29 ENCOUNTER — HOSPITAL ENCOUNTER (EMERGENCY)
Facility: HOSPITAL | Age: 25
Discharge: HOME | End: 2025-05-29
Attending: EMERGENCY MEDICINE
Payer: COMMERCIAL

## 2025-05-29 ENCOUNTER — APPOINTMENT (OUTPATIENT)
Dept: CT IMAGING | Facility: HOSPITAL | Age: 25
End: 2025-05-29
Payer: COMMERCIAL

## 2025-05-29 VITALS
OXYGEN SATURATION: 99 % | BODY MASS INDEX: 29.63 KG/M2 | TEMPERATURE: 99 F | WEIGHT: 211.64 LBS | HEART RATE: 84 BPM | DIASTOLIC BLOOD PRESSURE: 81 MMHG | RESPIRATION RATE: 18 BRPM | SYSTOLIC BLOOD PRESSURE: 142 MMHG | HEIGHT: 71 IN

## 2025-05-29 DIAGNOSIS — R04.0 ACUTE ANTERIOR EPISTAXIS: Primary | ICD-10-CM

## 2025-05-29 LAB
HCT VFR BLD AUTO: 40.3 % (ref 37.5–51)
HGB BLD-MCNC: 13.7 G/DL (ref 13–17.7)
HOLD SPECIMEN: NORMAL

## 2025-05-29 PROCEDURE — 25510000001 IOPAMIDOL PER 1 ML: Performed by: EMERGENCY MEDICINE

## 2025-05-29 PROCEDURE — 25010000002 DIPHENHYDRAMINE PER 50 MG: Performed by: EMERGENCY MEDICINE

## 2025-05-29 PROCEDURE — 25010000002 METOCLOPRAMIDE PER 10 MG: Performed by: EMERGENCY MEDICINE

## 2025-05-29 PROCEDURE — 96375 TX/PRO/DX INJ NEW DRUG ADDON: CPT

## 2025-05-29 PROCEDURE — 70487 CT MAXILLOFACIAL W/DYE: CPT

## 2025-05-29 PROCEDURE — 85018 HEMOGLOBIN: CPT | Performed by: EMERGENCY MEDICINE

## 2025-05-29 PROCEDURE — 25010000002 ONDANSETRON PER 1 MG: Performed by: EMERGENCY MEDICINE

## 2025-05-29 PROCEDURE — 96365 THER/PROPH/DIAG IV INF INIT: CPT

## 2025-05-29 PROCEDURE — 99285 EMERGENCY DEPT VISIT HI MDM: CPT

## 2025-05-29 PROCEDURE — 25810000003 SODIUM CHLORIDE 0.9 % SOLUTION: Performed by: EMERGENCY MEDICINE

## 2025-05-29 PROCEDURE — 85014 HEMATOCRIT: CPT | Performed by: EMERGENCY MEDICINE

## 2025-05-29 RX ORDER — ONDANSETRON 2 MG/ML
4 INJECTION INTRAMUSCULAR; INTRAVENOUS ONCE
Status: COMPLETED | OUTPATIENT
Start: 2025-05-29 | End: 2025-05-29

## 2025-05-29 RX ORDER — TRANEXAMIC ACID 10 MG/ML
1000 INJECTION, SOLUTION INTRAVENOUS ONCE
Status: COMPLETED | OUTPATIENT
Start: 2025-05-29 | End: 2025-05-29

## 2025-05-29 RX ORDER — DIPHENHYDRAMINE HYDROCHLORIDE 50 MG/ML
50 INJECTION, SOLUTION INTRAMUSCULAR; INTRAVENOUS ONCE
Status: COMPLETED | OUTPATIENT
Start: 2025-05-29 | End: 2025-05-29

## 2025-05-29 RX ORDER — METOCLOPRAMIDE HYDROCHLORIDE 5 MG/ML
10 INJECTION INTRAMUSCULAR; INTRAVENOUS ONCE
Status: COMPLETED | OUTPATIENT
Start: 2025-05-29 | End: 2025-05-29

## 2025-05-29 RX ORDER — ONDANSETRON 4 MG/1
4 TABLET, ORALLY DISINTEGRATING ORAL EVERY 8 HOURS PRN
Qty: 14 TABLET | Refills: 0 | Status: SHIPPED | OUTPATIENT
Start: 2025-05-29

## 2025-05-29 RX ORDER — IOPAMIDOL 755 MG/ML
100 INJECTION, SOLUTION INTRAVASCULAR
Status: COMPLETED | OUTPATIENT
Start: 2025-05-29 | End: 2025-05-29

## 2025-05-29 RX ADMIN — ONDANSETRON 4 MG: 2 INJECTION INTRAMUSCULAR; INTRAVENOUS at 07:45

## 2025-05-29 RX ADMIN — TRANEXAMIC ACID 1000 MG: 10 INJECTION, SOLUTION INTRAVENOUS at 11:18

## 2025-05-29 RX ADMIN — SODIUM CHLORIDE 1000 ML: 9 INJECTION, SOLUTION INTRAVENOUS at 07:42

## 2025-05-29 RX ADMIN — DIPHENHYDRAMINE HYDROCHLORIDE 50 MG: 50 INJECTION, SOLUTION INTRAMUSCULAR; INTRAVENOUS at 07:47

## 2025-05-29 RX ADMIN — IOPAMIDOL 100 ML: 755 INJECTION, SOLUTION INTRAVENOUS at 08:09

## 2025-05-29 RX ADMIN — METOCLOPRAMIDE 10 MG: 5 INJECTION, SOLUTION INTRAMUSCULAR; INTRAVENOUS at 07:46

## 2025-05-29 NOTE — ED PROVIDER NOTES
Time: 6:54 AM EDT  Date of encounter:  5/29/2025  Independent Historian/Clinical History and Information was obtained by:   Patient, Family, and Chart    History is limited by: N/A    Chief Complaint: Nosebleed      History of Present Illness:  Patient is a 24 y.o. year old male who presents to the emergency department for evaluation of nosebleed.  This is patient's third visit to the emergency department for right nare epistaxis.  Yesterday in the emergency department he was treated with Epifanio-Synephrine, pressure, cocaine and ultimately required repacking with nasal tampon/Rhino Rocket.      Patient Care Team  Primary Care Provider: Provider, Verenice Known    Past Medical History:     No Known Allergies  History reviewed. No pertinent past medical history.  History reviewed. No pertinent surgical history.  History reviewed. No pertinent family history.    Home Medications:  Prior to Admission medications    Medication Sig Start Date End Date Taking? Authorizing Provider   amoxicillin-clavulanate (AUGMENTIN) 875-125 MG per tablet Take 1 tablet by mouth Every 12 (Twelve) Hours. 5/27/25   Oscar Randle DO   Apple Cider Vinegar 250 MG chewable tablet Chew 250 mg 1 time.    Mariela Espinoza MD   [id number] PO Take 5,000 mg by mouth 1 time.    Mariela Espinoza MD   Multiple Vitamins-Minerals (MENS MULTI HEALTH FORMULA PO) Take  by mouth.    Mariela Espinoza MD        Social History:   Social History     Tobacco Use    Smoking status: Never   Vaping Use    Vaping status: Never Used   Substance Use Topics    Alcohol use: Never    Drug use: Never         Review of Systems:  Review of Systems   Constitutional:  Negative for chills and fever.   HENT:  Positive for nosebleeds. Negative for congestion, rhinorrhea and sore throat.    Eyes:  Negative for pain and visual disturbance.   Respiratory:  Negative for apnea, cough, chest tightness and shortness of breath.    Cardiovascular:  Negative for chest pain and  "palpitations.   Gastrointestinal:  Negative for abdominal pain, diarrhea, nausea and vomiting.   Genitourinary:  Negative for difficulty urinating and dysuria.   Musculoskeletal:  Negative for joint swelling and myalgias.   Skin:  Negative for color change.   Neurological:  Negative for seizures and headaches.   Psychiatric/Behavioral: Negative.     All other systems reviewed and are negative.       Physical Exam:  /95 (BP Location: Left arm, Patient Position: Lying)   Pulse 64   Temp 98.4 °F (36.9 °C) (Oral)   Resp 17   Ht 180.3 cm (71\")   Wt 96 kg (211 lb 10.3 oz)   SpO2 99%   BMI 29.52 kg/m²     Physical Exam  Vitals and nursing note reviewed.   Constitutional:       General: He is not in acute distress.     Appearance: Normal appearance. He is not toxic-appearing.   HENT:      Head: Normocephalic and atraumatic.      Jaw: There is normal jaw occlusion.      Nose:      Comments: Nasal tampon in right nare with coagulated blood around the nare.  No active epistaxis identified.     Mouth/Throat:      Comments: No blood dripping from the uvula.  Eyes:      General: Lids are normal.      Extraocular Movements: Extraocular movements intact.      Conjunctiva/sclera: Conjunctivae normal.      Pupils: Pupils are equal, round, and reactive to light.   Cardiovascular:      Rate and Rhythm: Normal rate and regular rhythm.      Pulses: Normal pulses.      Heart sounds: Normal heart sounds.   Pulmonary:      Effort: Pulmonary effort is normal. No respiratory distress.      Breath sounds: Normal breath sounds. No wheezing or rhonchi.   Abdominal:      General: Abdomen is flat.      Palpations: Abdomen is soft.      Tenderness: There is no abdominal tenderness. There is no guarding or rebound.   Musculoskeletal:         General: Normal range of motion.      Cervical back: Normal range of motion and neck supple.      Right lower leg: No edema.      Left lower leg: No edema.   Skin:     General: Skin is warm and dry. "   Neurological:      Mental Status: He is alert and oriented to person, place, and time. Mental status is at baseline.   Psychiatric:         Mood and Affect: Mood normal.                    Medical Decision Making:      Comorbidities that affect care:    None    External Notes reviewed:    None      The following orders were placed and all results were independently analyzed by me:  Orders Placed This Encounter   Procedures    CT Sinus With Contrast    Hemoglobin & Hematocrit, Blood    IP General Consult (Use specialty-specific consult if known)    Extra Tubes    Green Top (Gel)       Medications Given in the Emergency Department:  Medications   tranexamic acid 1000 mg in 100 mL 0.7% NaCl infusion (premix) (has no administration in time range)   sodium chloride 0.9 % bolus 1,000 mL (1,000 mL Intravenous New Bag 5/29/25 0742)   ondansetron (ZOFRAN) injection 4 mg (4 mg Intravenous Given 5/29/25 0745)   metoclopramide (REGLAN) injection 10 mg (10 mg Intravenous Given 5/29/25 0746)   diphenhydrAMINE (BENADRYL) injection 50 mg (50 mg Intravenous Given 5/29/25 0747)   iopamidol (ISOVUE-370) 76 % injection 100 mL (100 mL Intravenous Given 5/29/25 0809)        ED Course:         Labs:    Lab Results (last 24 hours)       Procedure Component Value Units Date/Time    CBC & Differential [[id number]]  (Abnormal) Collected: 05/28/25 1727    Specimen: Blood Updated: 05/28/25 1732    Narrative:      The following orders were created for panel order CBC & Differential.  Procedure                               Abnormality         Status                     ---------                               -----------         ------                     CBC Auto Differential[[id number]]        Abnormal            Final result                 Please view results for these tests on the individual orders.    CBC Auto Differential [[id number]]  (Abnormal) Collected: 05/28/25 1727    Specimen: Blood Updated: 05/28/25 1732     WBC 6.79 10*3/mm3       RBC 4.58 10*6/mm3      Hemoglobin 14.2 g/dL      Hematocrit 41.0 %      MCV 89.5 fL      MCH 31.0 pg      MCHC 34.6 g/dL      RDW 12.2 %      RDW-SD 39.1 fl      MPV 11.3 fL      Platelets 224 10*3/mm3      Neutrophil % 78.8 %      Lymphocyte % 15.2 %      Monocyte % 4.3 %      Eosinophil % 0.7 %      Basophil % 0.9 %      Immature Grans % 0.1 %      Neutrophils, Absolute 5.35 10*3/mm3      Lymphocytes, Absolute 1.03 10*3/mm3      Monocytes, Absolute 0.29 10*3/mm3      Eosinophils, Absolute 0.05 10*3/mm3      Basophils, Absolute 0.06 10*3/mm3      Immature Grans, Absolute 0.01 10*3/mm3      nRBC 0.0 /100 WBC     Hemoglobin & Hematocrit, Blood [[id number]]  (Normal) Collected: 05/29/25 0741    Specimen: Blood from Arm, Right Updated: 05/29/25 0759     Hemoglobin 13.7 g/dL      Hematocrit 40.3 %              Imaging:    CT Sinus With Contrast  Result Date: 5/29/2025  CT SINUS W CONTRAST Date of Exam: 5/29/2025 8:07 AM EDT Indication: eval for JNA, recurrent epistaxis. Comparison: None available. Technique: Axial CT images were obtained from the inferior aspect of the mandible through the frontal sinuses after the uneventful intravenous administration of iodinated contrast.  Reconstructed coronal and sagittal images were also obtained. Automated exposure control and iterative construction methods were used. Findings: There is no evidence of juvenile nasal angiofibroma. The sphenopalatine foramina appear normal bilaterally. There are air-fluid levels noted in the bilateral maxillary sinuses which demonstrate increased density. A small air-fluid level is noted in the right sphenoid sinus. There is opacification of the right ostiomeatal complex. There is opacification of several inferior right ethmoid sinuses. There is some opacity within the right nasal cavity. A cannula has been placed in the right nasal cavity. There are pooling secretions noted in the nasopharynx. There is a nondisplaced fracture of the left nasal  bone. No other fractures identified. The visualized brain appears normal.     Impression: Tiny nondisplaced fracture of the left nasal bone. No evidence of nasal angiofibroma. Opacity in multiple sinuses and the right nasal cavity/nasopharynx suspected to represent hemorrhage. Electronically Signed: Frederick Blanco MD  5/29/2025 8:47 AM EDT  Workstation ID: [id number]        Differential Diagnosis and Discussion:    Epistaxis: Differential diagnosis includes but is not limited to trauma, environmental exposure, coagulopathy, allergic, infectious, hypertension, foreign bodies, hormonal, and tumors.    PROCEDURES:    Labs were collected in the emergency department and all labs were reviewed and interpreted by me.    No orders to display       Procedures    MDM  Number of Diagnoses or Management Options  Acute anterior epistaxis  Diagnosis management comments: In summary this is a 24-year-old male patient who presents into Emergency Department for evaluation of epistaxis from the right nare.  He has had some mild bleeding around the packing.  Bleeding has ceased in the emergency department without intervention.  Patient was not repacked as he prefers to keep the current packing.  H&H independent review interpreted me is unremarkable negative.  Discussed case with ENT on-call who recommends CT of sinuses with contrast which has been performed and reviewed by me and shows nasal bone fracture but no adenofibroma.  Patient was also given IV TXA.  Otherwise well-appearing in no acute distress.  Very strict return to ER and follow-up instructions have been provided to the patient.                         Patient Care Considerations:    CT HEAD: I considered ordering a noncontrast CT of the head, however no signs of head or face trauma      Consultants/Shared Management Plan:    Consultant: I have discussed the case with Dr. Kirby who states would recommend CT of the sinuses with contrast to evaluate for possible  adenofibroma.    Social Determinants of Health:    Patient is independent, reliable, and has access to care.       Disposition and Care Coordination:    Discharged: The patient is suitable and stable for discharge with no need for consideration of admission.    I have explained the patient´s condition, diagnoses and treatment plan based on the information available to me at this time. I have answered questions and addressed any concerns. The patient has a good  understanding of the patient´s diagnosis, condition, and treatment plan as can be expected at this point. The vital signs have been stable. The patient´s condition is stable and appropriate for discharge from the emergency department.      The patient will pursue further outpatient evaluation with the primary care physician or other designated or consulting physician as outlined in the discharge instructions. They are agreeable to this plan of care and follow-up instructions have been explained in detail. The patient has received these instructions in written format and has expressed an understanding of the discharge instructions. The patient is aware that any significant change in condition or worsening of symptoms should prompt an immediate return to this or the closest emergency department or call to 911.  I have explained discharge medications and the need for follow up with the patient/caretakers. This was also printed in the discharge instructions. Patient was discharged with the following medications and follow up:      Medication List        New Prescriptions      ondansetron ODT 4 MG disintegrating tablet  Commonly known as: ZOFRAN-ODT  Place 1 tablet on the tongue Every 8 (Eight) Hours As Needed for Nausea or Vomiting.               Where to Get Your Medications        Information about where to get these medications is not yet available    Ask your nurse or doctor about these medications  ondansetron ODT 4 MG disintegrating tablet      No follow-up  provider specified.     Final diagnoses:   Acute anterior epistaxis        ED Disposition       ED Disposition   Discharge    Condition   Stable    Comment   --               This medical record created using voice recognition software.             Eduardo Torres MD  05/29/25 7158

## 2025-05-30 ENCOUNTER — HOSPITAL ENCOUNTER (EMERGENCY)
Facility: HOSPITAL | Age: 25
Discharge: OTHER INSTITUTION | End: 2025-05-31
Attending: EMERGENCY MEDICINE
Payer: COMMERCIAL

## 2025-05-30 DIAGNOSIS — R04.0 RECURRENT EPISTAXIS: Primary | ICD-10-CM

## 2025-05-30 LAB
ALBUMIN SERPL-MCNC: 4.6 G/DL (ref 3.5–5.2)
ALBUMIN/GLOB SERPL: 2 G/DL
ALP SERPL-CCNC: 66 U/L (ref 39–117)
ALT SERPL W P-5'-P-CCNC: 33 U/L (ref 1–41)
ANION GAP SERPL CALCULATED.3IONS-SCNC: 11.9 MMOL/L (ref 5–15)
AST SERPL-CCNC: 16 U/L (ref 1–40)
BASOPHILS # BLD AUTO: 0.05 10*3/MM3 (ref 0–0.2)
BASOPHILS NFR BLD AUTO: 0.9 % (ref 0–1.5)
BILIRUB SERPL-MCNC: 0.5 MG/DL (ref 0–1.2)
BUN SERPL-MCNC: 14.7 MG/DL (ref 6–20)
BUN/CREAT SERPL: 15.6 (ref 7–25)
CALCIUM SPEC-SCNC: 9.2 MG/DL (ref 8.6–10.5)
CHLORIDE SERPL-SCNC: 105 MMOL/L (ref 98–107)
CO2 SERPL-SCNC: 23.1 MMOL/L (ref 22–29)
CREAT SERPL-MCNC: 0.94 MG/DL (ref 0.76–1.27)
DEPRECATED RDW RBC AUTO: 38.7 FL (ref 37–54)
EGFRCR SERPLBLD CKD-EPI 2021: 116.1 ML/MIN/1.73
EOSINOPHIL # BLD AUTO: 0.04 10*3/MM3 (ref 0–0.4)
EOSINOPHIL NFR BLD AUTO: 0.8 % (ref 0.3–6.2)
ERYTHROCYTE [DISTWIDTH] IN BLOOD BY AUTOMATED COUNT: 12.2 % (ref 12.3–15.4)
GLOBULIN UR ELPH-MCNC: 2.3 GM/DL
GLUCOSE SERPL-MCNC: 102 MG/DL (ref 65–99)
HCT VFR BLD AUTO: 33.8 % (ref 37.5–51)
HGB BLD-MCNC: 12 G/DL (ref 13–17.7)
IMM GRANULOCYTES # BLD AUTO: 0.01 10*3/MM3 (ref 0–0.05)
IMM GRANULOCYTES NFR BLD AUTO: 0.2 % (ref 0–0.5)
LYMPHOCYTES # BLD AUTO: 1.26 10*3/MM3 (ref 0.7–3.1)
LYMPHOCYTES NFR BLD AUTO: 23.8 % (ref 19.6–45.3)
MCH RBC QN AUTO: 31.3 PG (ref 26.6–33)
MCHC RBC AUTO-ENTMCNC: 35.5 G/DL (ref 31.5–35.7)
MCV RBC AUTO: 88.3 FL (ref 79–97)
MONOCYTES # BLD AUTO: 0.5 10*3/MM3 (ref 0.1–0.9)
MONOCYTES NFR BLD AUTO: 9.4 % (ref 5–12)
NEUTROPHILS NFR BLD AUTO: 3.44 10*3/MM3 (ref 1.7–7)
NEUTROPHILS NFR BLD AUTO: 64.9 % (ref 42.7–76)
NRBC BLD AUTO-RTO: 0 /100 WBC (ref 0–0.2)
PLATELET # BLD AUTO: 225 10*3/MM3 (ref 140–450)
PMV BLD AUTO: 11.1 FL (ref 6–12)
POTASSIUM SERPL-SCNC: 3.9 MMOL/L (ref 3.5–5.2)
PROT SERPL-MCNC: 6.9 G/DL (ref 6–8.5)
RBC # BLD AUTO: 3.83 10*6/MM3 (ref 4.14–5.8)
SODIUM SERPL-SCNC: 140 MMOL/L (ref 136–145)
WBC NRBC COR # BLD AUTO: 5.3 10*3/MM3 (ref 3.4–10.8)

## 2025-05-30 PROCEDURE — 94640 AIRWAY INHALATION TREATMENT: CPT

## 2025-05-30 PROCEDURE — C9046 COCAINE HCL NASAL SOLUTION: HCPCS | Performed by: EMERGENCY MEDICINE

## 2025-05-30 PROCEDURE — 85025 COMPLETE CBC W/AUTO DIFF WBC: CPT | Performed by: EMERGENCY MEDICINE

## 2025-05-30 PROCEDURE — 96374 THER/PROPH/DIAG INJ IV PUSH: CPT

## 2025-05-30 PROCEDURE — 25810000003 SODIUM CHLORIDE 0.9 % SOLUTION: Performed by: EMERGENCY MEDICINE

## 2025-05-30 PROCEDURE — 94799 UNLISTED PULMONARY SVC/PX: CPT

## 2025-05-30 PROCEDURE — 99285 EMERGENCY DEPT VISIT HI MDM: CPT

## 2025-05-30 PROCEDURE — 80053 COMPREHEN METABOLIC PANEL: CPT | Performed by: EMERGENCY MEDICINE

## 2025-05-30 PROCEDURE — 25010000002 ONDANSETRON PER 1 MG: Performed by: EMERGENCY MEDICINE

## 2025-05-30 PROCEDURE — 99291 CRITICAL CARE FIRST HOUR: CPT

## 2025-05-30 PROCEDURE — 25010000002 COCAINE HCL 40 MG/ML SOLUTION: Performed by: EMERGENCY MEDICINE

## 2025-05-30 RX ORDER — SODIUM CHLORIDE 0.9 % (FLUSH) 0.9 %
10 SYRINGE (ML) INJECTION AS NEEDED
Status: DISCONTINUED | OUTPATIENT
Start: 2025-05-30 | End: 2025-05-31 | Stop reason: HOSPADM

## 2025-05-30 RX ORDER — TRANEXAMIC ACID 100 MG/ML
500 INJECTION, SOLUTION INTRAVENOUS ONCE
Status: COMPLETED | OUTPATIENT
Start: 2025-05-30 | End: 2025-05-30

## 2025-05-30 RX ORDER — MIDAZOLAM HYDROCHLORIDE 2 MG/2ML
2 INJECTION, SOLUTION INTRAMUSCULAR; INTRAVENOUS ONCE
Status: COMPLETED | OUTPATIENT
Start: 2025-05-31 | End: 2025-05-31

## 2025-05-30 RX ORDER — COCAINE HYDROCHLORIDE 40 MG/ML
4 SOLUTION NASAL ONCE
Refills: 0 | Status: COMPLETED | OUTPATIENT
Start: 2025-05-30 | End: 2025-05-30

## 2025-05-30 RX ORDER — ONDANSETRON 2 MG/ML
4 INJECTION INTRAMUSCULAR; INTRAVENOUS ONCE
Status: COMPLETED | OUTPATIENT
Start: 2025-05-30 | End: 2025-05-30

## 2025-05-30 RX ORDER — OXYMETAZOLINE HYDROCHLORIDE 0.05 G/100ML
2 SPRAY NASAL 2 TIMES DAILY
COMMUNITY

## 2025-05-30 RX ADMIN — SODIUM CHLORIDE 1000 ML: 9 INJECTION, SOLUTION INTRAVENOUS at 22:06

## 2025-05-30 RX ADMIN — ONDANSETRON 4 MG: 2 INJECTION INTRAMUSCULAR; INTRAVENOUS at 22:04

## 2025-05-30 RX ADMIN — TRANEXAMIC ACID 500 MG: 100 INJECTION, SOLUTION INTRAVENOUS at 22:50

## 2025-05-30 RX ADMIN — TRANEXAMIC ACID 500 MG: 100 INJECTION, SOLUTION INTRAVENOUS at 22:07

## 2025-05-30 RX ADMIN — COCAINE HYDROCHLORIDE NASAL 160 MG: 40 SOLUTION TOPICAL at 22:07

## 2025-05-31 VITALS
WEIGHT: 211.64 LBS | HEART RATE: 90 BPM | OXYGEN SATURATION: 99 % | RESPIRATION RATE: 16 BRPM | HEIGHT: 71 IN | DIASTOLIC BLOOD PRESSURE: 63 MMHG | BODY MASS INDEX: 29.63 KG/M2 | TEMPERATURE: 97.9 F | SYSTOLIC BLOOD PRESSURE: 123 MMHG

## 2025-05-31 LAB
INR PPP: 1.08 (ref 0.86–1.15)
PROTHROMBIN TIME: 14.4 SECONDS (ref 11.8–14.9)

## 2025-05-31 PROCEDURE — 85610 PROTHROMBIN TIME: CPT | Performed by: EMERGENCY MEDICINE

## 2025-05-31 PROCEDURE — 36415 COLL VENOUS BLD VENIPUNCTURE: CPT

## 2025-05-31 PROCEDURE — 25010000002 MIDAZOLAM PER 1MG: Performed by: EMERGENCY MEDICINE

## 2025-05-31 PROCEDURE — 96375 TX/PRO/DX INJ NEW DRUG ADDON: CPT

## 2025-05-31 RX ORDER — TRANEXAMIC ACID 10 MG/ML
1000 INJECTION, SOLUTION INTRAVENOUS ONCE
Status: COMPLETED | OUTPATIENT
Start: 2025-05-31 | End: 2025-05-31

## 2025-05-31 RX ADMIN — MIDAZOLAM HYDROCHLORIDE 2 MG: 1 INJECTION, SOLUTION INTRAMUSCULAR; INTRAVENOUS at 00:15

## 2025-05-31 RX ADMIN — TRANEXAMIC ACID 1000 MG: 10 INJECTION, SOLUTION INTRAVENOUS at 01:29

## 2025-05-31 NOTE — ED PROVIDER NOTES
Time: 9:49 PM EDT  Date of encounter:  5/30/2025  Independent Historian/Clinical History and Information was obtained by:   Patient    History is limited by: N/A    Chief Complaint: Epistaxis      History of Present Illness:  Patient is a 24 y.o. year old male who presents to the emergency department for evaluation of epistaxis    Today is the patient's fourth visit to the emergency department since a injury to his nose 3 days ago.  He has had his nose packed multiple times and was ultimately seen by ear nose and throat at [facility] today.  They were only able to see blood clots in his nose with a scope and attempted to pack his nose with Surgicel but while lying in bed tonight his nose began to bleed again and the packing became dislodged.  He has nausea but denies any pain.      Patient Care Team  Primary Care Provider: Provider, No Known    Past Medical History:     No Known Allergies  History reviewed. No pertinent past medical history.  History reviewed. No pertinent surgical history.  History reviewed. No pertinent family history.    Home Medications:  Prior to Admission medications    Medication Sig Start Date End Date Taking? Authorizing Provider   amoxicillin-clavulanate (AUGMENTIN) 875-125 MG per tablet Take 1 tablet by mouth Every 12 (Twelve) Hours. 5/27/25   Oscar Randle DO   Apple Cider Vinegar 250 MG chewable tablet Chew 250 mg 1 time.  Patient not taking: Reported on 5/30/2025    Mariela Espinoza MD   [id number] PO Take 5,000 mg by mouth 1 time.  Patient not taking: Reported on 5/30/2025    Mariela Espinoza MD   Multiple Vitamins-Minerals (MENS MULTI HEALTH FORMULA PO) Take  by mouth.  Patient not taking: Reported on 5/30/2025    Mariela Espinoza MD   ondansetron ODT (ZOFRAN-ODT) 4 MG disintegrating tablet Place 1 tablet on the tongue Every 8 (Eight) Hours As Needed for Nausea or Vomiting. 5/29/25   Eduardo Torres MD   oxymetazoline (AFRIN) 0.05 % nasal spray Administer 2  "sprays into the nostril(s) as directed by provider 2 (Two) Times a Day.    Provider, Mariela, MD        Social History:   Social History     Tobacco Use    Smoking status: Never   Vaping Use    Vaping status: Never Used   Substance Use Topics    Alcohol use: Never    Drug use: Never         Review of Systems:  Review of Systems   Constitutional:  Negative for chills and fever.   HENT:  Positive for nosebleeds. Negative for congestion, ear pain and sore throat.    Eyes:  Negative for pain.   Respiratory:  Negative for cough, chest tightness and shortness of breath.    Cardiovascular:  Negative for chest pain.   Gastrointestinal:  Negative for abdominal pain, diarrhea, nausea and vomiting.   Genitourinary:  Negative for flank pain and hematuria.   Musculoskeletal:  Negative for joint swelling.   Skin:  Negative for pallor.   Neurological:  Negative for seizures and headaches.   All other systems reviewed and are negative.       Physical Exam:  /63   Pulse 90   Temp 97.9 °F (36.6 °C) (Oral)   Resp 16   Ht 180.3 cm (70.98\")   Wt 96 kg (211 lb 10.3 oz)   SpO2 99%   BMI 29.53 kg/m²     Physical Exam  Vitals and nursing note reviewed.   Constitutional:       General: He is in acute distress.      Appearance: He is not toxic-appearing.      Comments: Patient appears uncomfortable.   HENT:      Head: Normocephalic and atraumatic.      Jaw: There is normal jaw occlusion.      Nose:      Comments: Active bleeding and clots from bilateral naris.  Blood in the posterior oropharynx.  Eyes:      General: Lids are normal.      Extraocular Movements: Extraocular movements intact.      Conjunctiva/sclera: Conjunctivae normal.      Pupils: Pupils are equal, round, and reactive to light.   Cardiovascular:      Rate and Rhythm: Normal rate and regular rhythm.      Pulses: Normal pulses.      Heart sounds: Normal heart sounds.   Pulmonary:      Effort: Pulmonary effort is normal. No respiratory distress.      Breath " sounds: Normal breath sounds. No wheezing or rhonchi.   Abdominal:      General: Abdomen is flat.      Palpations: Abdomen is soft.      Tenderness: There is no abdominal tenderness. There is no guarding or rebound.   Musculoskeletal:         General: Normal range of motion.      Cervical back: Normal range of motion and neck supple.      Right lower leg: No edema.      Left lower leg: No edema.   Skin:     General: Skin is warm and dry.   Neurological:      Mental Status: He is alert and oriented to person, place, and time. Mental status is at baseline.   Psychiatric:         Mood and Affect: Mood normal.                  Medical Decision Making:      Comorbidities that affect care:    None    External Notes reviewed:    Previous ED Note: Recent ED visits from 5/27/2025 5/28/2025 to 5/29/2025      The following orders were placed and all results were independently analyzed by me:  Orders Placed This Encounter   Procedures    Epistaxis Management    Protime-INR    Comprehensive Metabolic Panel    CBC Auto Differential    CBC & Differential       Medications Given in the Emergency Department:  Medications   Cocaine HCl 40 MG/ML nasal solution 160 mg (160 mg Topical Given 5/30/25 2207)   tranexamic acid 500 MG/5ML (ED/Crit Care for lacerations) - VIAL DISPENSE 500 mg (500 mg Topical Given 5/30/25 2207)   sodium chloride 0.9 % bolus 1,000 mL (0 mL Intravenous Stopped 5/30/25 2334)   ondansetron (ZOFRAN) injection 4 mg (4 mg Intravenous Given 5/30/25 2204)   tranexamic acid nebulization (ED/Crit Care for hemoptysis) - VIAL DISPENSE 500 mg (500 mg Nebulization Given 5/30/25 2250)   Midazolam HCl (PF) (VERSED) injection 2 mg (2 mg Intravenous Given 5/31/25 0015)   tranexamic acid 1000 mg in 100 mL 0.7% NaCl infusion (premix) (1,000 mg Intravenous New Bag 5/31/25 0129)        ED Course:    ED Course as of 05/31/25 0730   Sat May 31, 2025   0001 After nasal sponge was placed patient had a event of significant rebleeding.   He is hemostatic now after treatment with a TXA nebulizer treatment. [JS]   0023 I discussed patient's workup and presentation with Dr. Hairston of ENT at [facility] who recommends transfer to the emergency department for further evaluation.  Patient has been accepted in transfer by Dr. Salazar.  Patient's nose is hemostatic at this time and safe for transport by private vehicle [JS]      ED Course User Index  [JS] Lai Love MD       Labs:    Lab Results (last 24 hours)       Procedure Component Value Units Date/Time    CBC & Differential [[id number]]  (Abnormal) Collected: 05/30/25 2159    Specimen: Blood Updated: 05/30/25 2205    Narrative:      The following orders were created for panel order CBC & Differential.  Procedure                               Abnormality         Status                     ---------                               -----------         ------                     CBC Auto Differential[[id number]]        Abnormal            Final result                 Please view results for these tests on the individual orders.    Comprehensive Metabolic Panel [[id number]]  (Abnormal) Collected: 05/30/25 2159    Specimen: Blood Updated: 05/30/25 2241     Glucose 102 mg/dL      BUN 14.7 mg/dL      Creatinine 0.94 mg/dL      Sodium 140 mmol/L      Potassium 3.9 mmol/L      Chloride 105 mmol/L      CO2 23.1 mmol/L      Calcium 9.2 mg/dL      Total Protein 6.9 g/dL      Albumin 4.6 g/dL      ALT (SGPT) 33 U/L      AST (SGOT) 16 U/L      Alkaline Phosphatase 66 U/L      Total Bilirubin 0.5 mg/dL      Globulin 2.3 gm/dL      A/G Ratio 2.0 g/dL      BUN/Creatinine Ratio 15.6     Anion Gap 11.9 mmol/L      eGFR 116.1 mL/min/1.73     Narrative:      GFR Categories in Chronic Kidney Disease (CKD)              GFR Category          GFR (mL/min/1.73)    Interpretation  G1                    90 or greater        Normal or high (1)  G2                    60-89                Mild decrease (1)  G3a                   45-59                 Mild to moderate decrease  G3b                   30-44                Moderate to severe decrease  G4                    15-29                Severe decrease  G5                    14 or less           Kidney failure    (1)In the absence of evidence of kidney disease, neither GFR category G1 or G2 fulfill the criteria for CKD.    eGFR calculation 2021 CKD-EPI creatinine equation, which does not include race as a factor    CBC Auto Differential [[id number]]  (Abnormal) Collected: 05/30/25 2159    Specimen: Blood Updated: 05/30/25 2205     WBC 5.30 10*3/mm3      RBC 3.83 10*6/mm3      Hemoglobin 12.0 g/dL      Hematocrit 33.8 %      MCV 88.3 fL      MCH 31.3 pg      MCHC 35.5 g/dL      RDW 12.2 %      RDW-SD 38.7 fl      MPV 11.1 fL      Platelets 225 10*3/mm3      Neutrophil % 64.9 %      Lymphocyte % 23.8 %      Monocyte % 9.4 %      Eosinophil % 0.8 %      Basophil % 0.9 %      Immature Grans % 0.2 %      Neutrophils, Absolute 3.44 10*3/mm3      Lymphocytes, Absolute 1.26 10*3/mm3      Monocytes, Absolute 0.50 10*3/mm3      Eosinophils, Absolute 0.04 10*3/mm3      Basophils, Absolute 0.05 10*3/mm3      Immature Grans, Absolute 0.01 10*3/mm3      nRBC 0.0 /100 WBC     Protime-INR [[id number]]  (Normal) Collected: 05/31/25 0017    Specimen: Blood Updated: 05/31/25 0045     Protime 14.4 Seconds      INR 1.08    Narrative:      Suggested Therapeutic Ranges For Oral Anticoagulant Therapy:  Level of Therapy                      INR Target Range  Standard Dose                            2.0-3.0  High Dose                                2.5-3.5  Patients not receiving anticoagulant  Therapy Normal Range                     0.86-1.15             Imaging:    No Radiology Exams Resulted Within Past 24 Hours      Differential Diagnosis and Discussion:    Epistaxis: Differential diagnosis includes but is not limited to trauma, environmental exposure, coagulopathy, allergic, infectious, hypertension, foreign bodies,  hormonal, and tumors.    PROCEDURES:    Labs were collected in the emergency department and all labs were reviewed and interpreted by me.    No orders to display       Epistaxis Management    Date/Time: 5/30/2025 10:23 PM    Performed by: Lai Love MD  Authorized by: Lai Love MD    Consent:     Consent obtained:  Verbal    Consent given by:  Patient    Risks discussed:  Pain and nasal injury    Alternatives discussed:  Delayed treatment and no treatment  Universal protocol:     Patient identity confirmed:  Verbally with patient  Anesthesia:     Anesthesia method:  Topical application    Topical anesthetic:  Cocaine  Procedure details:     Treatment site:  R posterior    Treatment method:  Merocel sponge    Treatment complexity:  Limited    Treatment episode: initial    Post-procedure details:     Assessment:  Bleeding stopped    Procedure completion:  Tolerated well, no immediate complications  Comments:      After the Merocel was in place I applied 500 mg of TXA      MDM     Amount and/or Complexity of Data Reviewed  Decide to obtain previous medical records or to obtain history from someone other than the patient: yes             Patient requires transfer to an outside facility. Lab and radiology findings were discussed at length with accepting physician and consultants. Patient was placed on the cardiac monitor and was repeatedly assessed.  They were monitored for ventricular ectopy, arrhythmia, tachycardia, hypoxia, changes in blood pressure, and declining mental status several times throughout their stay in the emergency department.    Total Critical Care time of 35 minutes. Total critical care time documented does not include time spent on separately billed procedures for services of nurses or physician assistants. I personally saw and examined the patient. I have reviewed all diagnostic interpretations and treatment plans as written. I was present for the key portions of any procedures performed  and the inclusive time noted in any critical care statement. Critical care time includes patient management by me, time spent at the patients bedside,  time to review lab and imaging results, discussing patient care, documentation in the medical record, and time spent with family or caregiver.          Patient Care Considerations:    NARCOTICS: I considered prescribing opiate pain medication as an outpatient, however no pain control required in the emergency department.      Consultants/Shared Management Plan:    Transfer Provider: I have discussed the case with Dr. Salazar at [facility] who agrees to accept the patient as a transfer.    Social Determinants of Health:    Patient has presented with family members who are responsible, reliable and will ensure follow up care.      Disposition and Care Coordination:    Transferred: Through independent evaluation of the patient's history, physical, and imperical data, the patient meets criteria to be transferred to another hospital for evaluation/admission.        Final diagnoses:   Recurrent epistaxis        ED Disposition       ED Disposition   Transfer to Another Facility     Condition   --    Comment   --               This medical record created using voice recognition software.             Lai Love MD  05/31/25 0731

## 2025-06-09 ENCOUNTER — OFFICE VISIT (OUTPATIENT)
Dept: FAMILY MEDICINE CLINIC | Facility: CLINIC | Age: 25
End: 2025-06-09
Payer: COMMERCIAL

## 2025-06-09 VITALS
WEIGHT: 231 LBS | OXYGEN SATURATION: 100 % | SYSTOLIC BLOOD PRESSURE: 136 MMHG | BODY MASS INDEX: 33.07 KG/M2 | DIASTOLIC BLOOD PRESSURE: 67 MMHG | TEMPERATURE: 98 F | HEART RATE: 76 BPM | HEIGHT: 70 IN

## 2025-06-09 DIAGNOSIS — Z13.29 SCREENING FOR THYROID DISORDER: ICD-10-CM

## 2025-06-09 DIAGNOSIS — Z98.890 POST-OPERATIVE STATE: ICD-10-CM

## 2025-06-09 DIAGNOSIS — Z76.89 ENCOUNTER TO ESTABLISH CARE WITH NEW DOCTOR: Primary | ICD-10-CM

## 2025-06-09 PROCEDURE — 99203 OFFICE O/P NEW LOW 30 MIN: CPT

## 2025-06-09 RX ORDER — HYDROCODONE BITARTRATE AND ACETAMINOPHEN 5; 325 MG/1; MG/1
1 TABLET ORAL EVERY 8 HOURS PRN
COMMUNITY
Start: 2025-06-04

## 2025-06-09 NOTE — PROGRESS NOTES
Chief Complaint     [facility] Care    Patient or patient representative verbalized consent for the use of Ambient Listening during the visit with  HAYLIE Yang for chart documentation. 6/9/2025  13:57 EDT    History of Present Illness     Yovanny Caldwell is a 24 y.o. male who presents to [facility] .    History of Present Illness  The patient presents for evaluation following a traumatic accident that caused septal deviation and significant bleeding.    He experienced a traumatic incident involving a blow to the nose with a wrist during a volleyball game, resulting in a persistent nosebleed lasting approximately one week. Prior to the surgical intervention, his hemoglobin level was recorded at 7.6. Despite attempts to manage the bleeding with nasal packing and coagulants, the bleeding persisted, necessitating multiple emergency room visits. He was subsequently referred to another facility due to the high patient volume at the initial hospital. At the new facility, he was admitted and underwent further treatment before requiring surgery. His most recent surgery was performed last Tuesday, and he was discharged the following Wednesday. His hemoglobin level was back up to 12 upon discharge. He reports no other current health concerns.    He is seeking short-term disability for the current week and has been advised by his employer to consult with his primary care provider regarding this matter. He is also interested in updating his immunizations and having his blood work done, as he has not had a primary care provider since graduating from high school.    PAST SURGICAL HISTORY:  Septoplasty on 06/03/2025    SOCIAL HISTORY  He does not smoke.         History      History reviewed. No pertinent past medical history.    Past Surgical History:   Procedure Laterality Date    NOSE SURGERY         Family History   Problem Relation Age of Onset    No Known Problems Mother     No Known  "Problems Father         Current Medications        Current Outpatient Medications:     amoxicillin-clavulanate (AUGMENTIN) 875-125 MG per tablet, Take 1 tablet by mouth Every 12 (Twelve) Hours., Disp: 20 tablet, Rfl: 0    HYDROcodone-acetaminophen (NORCO) 5-325 MG per tablet, Take 1 tablet by mouth Every 8 (Eight) Hours As Needed for Moderate Pain., Disp: , Rfl:     Multiple Vitamins-Minerals (MENS MULTI HEALTH FORMULA PO), Take  by mouth., Disp: , Rfl:     oxymetazoline (AFRIN) 0.05 % nasal spray, Administer 2 sprays into the nostril(s) as directed by provider 2 (Two) Times a Day., Disp: , Rfl:      Allergies     No Known Allergies    Social History       Social History     Social History Narrative    Not on file       Immunizations     Immunization:  Immunization History   Administered Date(s) Administered    DTaP 2000    DTaP, Unspecified 2000, 2000, 01/09/2001, 10/05/2001, 07/12/2004    FluMist 2-49yrs (Nasal) 02/07/2012    HPV Quadrivalent 02/07/2012, 07/26/2012, 05/16/2013, 07/31/2014    Hep A, 2 Dose 02/07/2012, 07/31/2014    Hep B, Adolescent or Pediatric 2000, 2000, 01/09/2001    HiB 2000, 2000, 01/09/2001, 10/05/2001    Hib (PRP-OMP) 2000    IPV 2000    Influenza Seasonal Injectable 11/04/2010    MCV4 Unspecified 02/07/2012    MMR 07/02/2001, 07/12/2004    Meningococcal MCV4P (Menactra) 09/29/2016    PEDS-Pneumococcal Conjugate (PCV7) 2000    Pneumococcal Conjugate Unspecified 2000, 2000, 01/09/2001, 07/02/2001    Polio, Unspecified 2000, 2000, 10/05/2001, 07/12/2004    Tdap 02/07/2012    Varicella 07/02/2001, 08/25/2008          Objective     Objective     Vital Signs:   /67 (BP Location: Right arm, Patient Position: Sitting, Cuff Size: Adult)   Pulse 76   Temp 98 °F (36.7 °C) (Temporal)   Ht 177.8 cm (70\")   Wt 105 kg (231 lb)   SpO2 100%   BMI 33.15 kg/m²       Physical Exam    Physical Exam  Respiratory: " Clear to auscultation, no wheezing, rales or rhonchi  Cardiovascular: Regular rate and rhythm, no murmurs, rubs, or gallops      Results    The following data was reviewed by: HAYLIE Yang on 06/09/2025:          Results  Labs   - Hemoglobin: 7.6 g/dL   - Hemoglobin: 12 g/dL       Assessment and Plan        Assessment and Plan    Diagnoses and all orders for this visit:    1. Encounter to establish care with new doctor (Primary)    2. Post-operative state  Comments:  post operative septal deviation repair  Orders:  -     CBC w AUTO Differential; Future  -     Comprehensive metabolic panel; Future    3. Screening for thyroid disorder  -     TSH+Free T4; Future        Assessment & Plan  1. Septal Deviation.  - Traumatic injury to the nose while playing volleyball resulted in significant bleeding and septal deviation.  - Hemoglobin levels dropped to 7.6 g/dL due to bleeding but were reported to be back up to 12 g/dL post-surgery.  - Underwent septoplasty on 06/03/2025 to correct the deviation.  - Advised to follow up with ENT specialist for further issues related to septal deviation.    2. Short-term Disability.  - Needs documentation for short-term disability due to recent surgery and recovery period.  - Advised to take short-term disability paperwork to ENT specialist during appointment on 06/10/2025 for completion.  - If ENT specialist is unable to fill out paperwork, advised to return to our office for assistance.      Follow Up        Follow Up   Return if symptoms worsen or fail to improve.  Patient was given instructions and counseling regarding his condition or for health maintenance advice. Please see specific information pulled into the AVS if appropriate.

## 2025-06-10 ENCOUNTER — PATIENT ROUNDING (BHMG ONLY) (OUTPATIENT)
Dept: FAMILY MEDICINE CLINIC | Facility: CLINIC | Age: 25
End: 2025-06-10
Payer: COMMERCIAL